# Patient Record
Sex: FEMALE
[De-identification: names, ages, dates, MRNs, and addresses within clinical notes are randomized per-mention and may not be internally consistent; named-entity substitution may affect disease eponyms.]

---

## 2023-09-22 ENCOUNTER — P2P PATIENT RECORD (OUTPATIENT)
Age: 48
End: 2023-09-22

## 2023-10-10 ENCOUNTER — OFFICE VISIT (OUTPATIENT)
Dept: URBAN - METROPOLITAN AREA CLINIC 84 | Facility: CLINIC | Age: 48
End: 2023-10-10

## 2023-10-10 RX ORDER — BACLOFEN 5 MG/1
TABLET ORAL
Qty: 30 TABLET | Status: ACTIVE | COMMUNITY

## 2023-10-10 RX ORDER — PHENTERMINE HYDROCHLORIDE 15 MG/1
TAKE 1 CAPSULE BY MOUTH EVERY DAY CAPSULE ORAL
Qty: 30 EACH | Refills: 0 | Status: ACTIVE | COMMUNITY

## 2023-10-10 RX ORDER — LOSARTAN POTASSIUM 50 MG/1
TABLET, FILM COATED ORAL
Qty: 90 TABLET | Status: ACTIVE | COMMUNITY

## 2024-04-29 ENCOUNTER — LAB (OUTPATIENT)
Dept: URBAN - METROPOLITAN AREA CLINIC 115 | Facility: CLINIC | Age: 49
End: 2024-04-29

## 2024-04-29 ENCOUNTER — OV NP (OUTPATIENT)
Dept: URBAN - METROPOLITAN AREA CLINIC 115 | Facility: CLINIC | Age: 49
End: 2024-04-29
Payer: COMMERCIAL

## 2024-04-29 VITALS
DIASTOLIC BLOOD PRESSURE: 91 MMHG | HEART RATE: 118 BPM | BODY MASS INDEX: 50.75 KG/M2 | HEIGHT: 62 IN | SYSTOLIC BLOOD PRESSURE: 164 MMHG | WEIGHT: 275.8 LBS | TEMPERATURE: 98.3 F

## 2024-04-29 DIAGNOSIS — Z80.0 FAMILY HISTORY OF COLON CANCER: ICD-10-CM

## 2024-04-29 DIAGNOSIS — K76.0 FATTY LIVER DISEASE, NONALCOHOLIC: ICD-10-CM

## 2024-04-29 DIAGNOSIS — R19.7 ACUTE DIARRHEA: ICD-10-CM

## 2024-04-29 DIAGNOSIS — K21.9 GASTROESOPHAGEAL REFLUX DISEASE, UNSPECIFIED WHETHER ESOPHAGITIS PRESENT: ICD-10-CM

## 2024-04-29 DIAGNOSIS — R10.817 GENERALIZED ABDOMINAL TENDERNESS WITHOUT REBOUND TENDERNESS: ICD-10-CM

## 2024-04-29 DIAGNOSIS — R29.898 LEG WEAKNESS, BILATERAL: ICD-10-CM

## 2024-04-29 DIAGNOSIS — E66.01 MORBID (SEVERE) OBESITY DUE TO EXCESS CALORIES: ICD-10-CM

## 2024-04-29 PROBLEM — 312824007: Status: ACTIVE | Noted: 2024-04-29

## 2024-04-29 PROBLEM — 235595009: Status: ACTIVE | Noted: 2024-04-29

## 2024-04-29 PROBLEM — 83911000119104: Status: ACTIVE | Noted: 2024-04-29

## 2024-04-29 PROBLEM — 1231824009: Status: ACTIVE | Noted: 2024-04-29

## 2024-04-29 PROBLEM — 408512008: Status: ACTIVE | Noted: 2024-04-29

## 2024-04-29 PROCEDURE — 99244 OFF/OP CNSLTJ NEW/EST MOD 40: CPT

## 2024-04-29 RX ORDER — BACLOFEN 5 MG/1
TABLET ORAL
Qty: 30 TABLET | Status: ON HOLD | COMMUNITY

## 2024-04-29 RX ORDER — PANTOPRAZOLE SODIUM 40 MG/1
1 TABLET BEFORE BREAKFAST TABLET, DELAYED RELEASE ORAL ONCE A DAY
Qty: 30 TABLET | Refills: 1 | OUTPATIENT
Start: 2024-04-29

## 2024-04-29 RX ORDER — PHENTERMINE HYDROCHLORIDE 15 MG/1
TAKE 1 CAPSULE BY MOUTH EVERY DAY CAPSULE ORAL
Qty: 30 EACH | Refills: 0 | Status: ON HOLD | COMMUNITY

## 2024-04-29 RX ORDER — LOSARTAN POTASSIUM 50 MG/1
TABLET, FILM COATED ORAL
Qty: 90 TABLET | Status: ACTIVE | COMMUNITY

## 2024-04-29 NOTE — PHYSICAL EXAM GASTROINTESTINAL
Abdomen , soft, generalized tenderness without rebound worst on the LLQ out of proportion to exam, nondistended , no guarding or rigidity , no masses palpable

## 2024-04-29 NOTE — HPI-TODAY'S VISIT:
48 y/o F with PMH of HTN, preDM presents on referral from Dr. Michelle Leyva for abdominal pain. A copy of this note will be sent to referring provider.  Went to [facility] ED on 3/7/24 for epigastric abdminal pain and n/v. US showed positive Rodriges's sign without cholecystitis, hepatic steatosis. Returned on 4/2/24 for same sx after getting Ozempic injection on March 27. CT only showed fatty liver. Returned to ER on 4/15/24 with n/v/d and abdominal pain. Was given bentyl, zofran, imodium. Labs showed stable Hgb, AST 91, ALT 99, tot bili 0.9, alkP 65. FIB4 1.60. She states that the beginning of March visit was d/t a "bad case of gas". States bentyl has helped but makes her drowsy. Reports abdominal pain with needing to go to [facility] and if she gets hungry; Gets nauseated with eating, bitter taste in mouth, acid reflux, diarrhea 4-5x a day. States she gets weakness in her legs sometimes, leading to her falling. Denies constipation, heartburn, blood in stool, dysphagia, odynophagia.. Takes ibuprofen for back pain PRN (once a week), Denies EtOH/tobacco/marijuana use.  Reports FHX of CRC in father (dx'd at age 50s) Last colonoscopy: never Denies PMH of MI, stroke, seizures, BiPAP use, pacemaker/defibrillator, blood thinners, ESRD.

## 2024-06-24 ENCOUNTER — OFFICE VISIT (OUTPATIENT)
Dept: URBAN - METROPOLITAN AREA CLINIC 115 | Facility: CLINIC | Age: 49
End: 2024-06-24

## 2024-06-24 RX ORDER — LOSARTAN POTASSIUM 50 MG/1
TABLET, FILM COATED ORAL
Qty: 90 TABLET | Status: ACTIVE | COMMUNITY

## 2024-06-24 RX ORDER — PHENTERMINE HYDROCHLORIDE 15 MG/1
TAKE 1 CAPSULE BY MOUTH EVERY DAY CAPSULE ORAL
Qty: 30 EACH | Refills: 0 | Status: ON HOLD | COMMUNITY

## 2024-06-24 RX ORDER — PANTOPRAZOLE SODIUM 40 MG/1
1 TABLET BEFORE BREAKFAST TABLET, DELAYED RELEASE ORAL ONCE A DAY
Qty: 30 TABLET | Refills: 1 | Status: ACTIVE | COMMUNITY
Start: 2024-04-29

## 2024-06-24 RX ORDER — BACLOFEN 5 MG/1
TABLET ORAL
Qty: 30 TABLET | Status: ON HOLD | COMMUNITY

## 2024-06-24 NOTE — HPI-TODAY'S VISIT:
50 y/o F with PMH of HTN, preDM presents on referral from Dr. Michelle Leyva for abdominal pain. A copy of this note will be sent to referring provider.  Went to [facility] ED on 3/7/24 for epigastric abdminal pain and n/v. US showed positive Rodriges's sign without cholecystitis, hepatic steatosis. Returned on 4/2/24 for same sx after getting Ozempic injection on March 27. CT only showed fatty liver. Returned to ER on 4/15/24 with n/v/d and abdominal pain. Was given bentyl, zofran, imodium. Labs showed stable Hgb, AST 91, ALT 99, tot bili 0.9, alkP 65. FIB4 1.60. She states that the beginning of March visit was d/t a "bad case of gas". States bentyl has helped but makes her drowsy. Reports abdominal pain with needing to go to [facility] and if she gets hungry; Gets nauseated with eating, bitter taste in mouth, acid reflux, diarrhea 4-5x a day. States she gets weakness in her legs sometimes, leading to her falling. Denies constipation, heartburn, blood in stool, dysphagia, odynophagia.. Takes ibuprofen for back pain PRN (once a week), Denies EtOH/tobacco/marijuana use.  Reports FHX of CRC in father (dx'd at age 50s) Last colonoscopy: never Denies PMH of MI, stroke, seizures, BiPAP use, pacemaker/defibrillator, blood thinners, ESRD.

## 2024-06-28 ENCOUNTER — OFFICE VISIT (OUTPATIENT)
Dept: URBAN - METROPOLITAN AREA MEDICAL CENTER 26 | Facility: MEDICAL CENTER | Age: 49
End: 2024-06-28

## 2024-06-28 RX ORDER — BACLOFEN 5 MG/1
TABLET ORAL
Qty: 30 TABLET | Status: ON HOLD | COMMUNITY

## 2024-06-28 RX ORDER — LOSARTAN POTASSIUM 50 MG/1
TABLET, FILM COATED ORAL
Qty: 90 TABLET | Status: ACTIVE | COMMUNITY

## 2024-06-28 RX ORDER — PHENTERMINE HYDROCHLORIDE 15 MG/1
TAKE 1 CAPSULE BY MOUTH EVERY DAY CAPSULE ORAL
Qty: 30 EACH | Refills: 0 | Status: ON HOLD | COMMUNITY

## 2024-06-28 RX ORDER — PANTOPRAZOLE SODIUM 40 MG/1
1 TABLET BEFORE BREAKFAST TABLET, DELAYED RELEASE ORAL ONCE A DAY
Qty: 30 TABLET | Refills: 1 | Status: ACTIVE | COMMUNITY
Start: 2024-04-29